# Patient Record
Sex: MALE | Race: BLACK OR AFRICAN AMERICAN
[De-identification: names, ages, dates, MRNs, and addresses within clinical notes are randomized per-mention and may not be internally consistent; named-entity substitution may affect disease eponyms.]

---

## 2017-12-13 ENCOUNTER — HOSPITAL ENCOUNTER (OUTPATIENT)
Dept: HOSPITAL 61 - KCIC MRI | Age: 62
Discharge: HOME | End: 2017-12-13
Attending: NURSE PRACTITIONER
Payer: COMMERCIAL
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
## 2017-12-13 DIAGNOSIS — M94.262: ICD-10-CM
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
## 2017-12-13 DIAGNOSIS — S83.242A: Primary | ICD-10-CM
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
## 2017-12-13 PROCEDURE — 73721 MRI JNT OF LWR EXTRE W/O DYE: CPT

## 2017-12-18 ENCOUNTER — HOSPITAL ENCOUNTER (OUTPATIENT)
Dept: HOSPITAL 61 - SURG | Age: 62
End: 2017-12-18
Attending: ORTHOPAEDIC SURGERY
Payer: COMMERCIAL

## 2017-12-18 VITALS
SYSTOLIC BLOOD PRESSURE: 119 MMHG | DIASTOLIC BLOOD PRESSURE: 80 MMHG | SYSTOLIC BLOOD PRESSURE: 119 MMHG | DIASTOLIC BLOOD PRESSURE: 80 MMHG
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
## 2017-12-18 DIAGNOSIS — S83.242A: Primary | ICD-10-CM
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
## 2017-12-18 DIAGNOSIS — F17.200: ICD-10-CM
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
## 2017-12-18 DIAGNOSIS — H40.9: ICD-10-CM

## 2017-12-18 DIAGNOSIS — Z98.890: ICD-10-CM

## 2017-12-18 PROCEDURE — 29881 ARTHRS KNE SRG MNISECTMY M/L: CPT

## 2017-12-18 PROCEDURE — C1782 MORCELLATOR: HCPCS

## 2017-12-18 PROCEDURE — S0028 INJECTION, FAMOTIDINE, 20 MG: HCPCS

## 2017-12-18 NOTE — PDOC4
Operative Note


Operative Note


Date of Procedure: 12/18/17





Surgeon: Kurtis Ron





Preoperative Diagnosis: Left knee medial meniscus tear





Postoperative diagnosis same





Procedure performed: Left knee arthroscopic partial medial meniscectomy





Tourniquet time: 23 minutes





Anesthesia: Gen.





Findings: Patient had grade 1 changes of patellofemoral joint


No loose bodies


Grade 1-2 changes medial femoral condyle from 30-60


Complex undersurface tear of posterior body of medial meniscus


Intact cruciate ligaments


Soft and fissured lateral tibial plateau cartilage


Intact cartilage lateral femoral condyle next


Intact lateral meniscus





Complications: None





Reason for procedure: Osman is a very pleasant 62-year-old with point 

tenderness at his medial joint line of his left knee that has been going on for 

quite some time. He has tried and rehabilitation program, self-directed exercise

, anti-inflammatories, and intra-articular injection which gave him only 

temporary relief. Due to persistent pain in his physical exam and imaging 

findings, I discussed the risks, benefits, and alternatives to the above 

surgery and he wished to proceed.





Description of procedure:  Patient was greeted in the preoperative area by 

myself for the correct extremity was marked and verified. He was then taken 

back to the operative suite and his antibiotics were started during transfer. 

Once in the OR, he was transferred gently supine to the OR table and secured to 

the bed with all pressure points padded. A nonsterile tourniquet was placed to 

his left upper thigh after successful induction of a general anesthetic. We had 

a padded bolster laterally at his hip. We had a padded bar across foot of the 

bed to maintain his knee in 90 of flexion passively. I then conducted my 

examination under anesthesia, his knee was stable to varus and valgus and had a 

negative Lachman. No pivot. After this we proceeded prep and drape left lower 

extremity in her usual sterile fashion and conducted our standard preoperative 

timeout. The extremity was then exsanguinated with an Esmarch and tourniquet 

was insufflated to 250 mmHg. I then palpated and marked her surface anatomy and 

made an incision for my standard anterolateral arthroscopic portal and 

introduced a blunt arthroscopic trocar into the suprapatellar pouch followed by 

the camera. I then began my diagnostic arthroscopy with the above-noted 

findings. Upon entering the medial compartment, used a spinal to localize an 

anteromedial portal and incised skin in accordance with this. I introduce my 

probe and continued on with my diagnostic arthroscopy. I inspected his meniscus 

and noted the tear pattern and probed it. I then debrided the meniscus tear 

back to stable edges with a combination of shaver and arthroscopic biters at 

his medial meniscus. I then continued on and inspected his notch as well as 

placement to figure-of-four position and inspected his lateral compartment. I 

then reinspected the gutters as well as I placed the camera in the 

suprapatellar pouch followed by the shaver performed repeated aspiration 

maneuvers through the shaver to ensure that accomplished removing all loose 

debris. I then removed all excess arthroscopic fluid and the arthroscopic 

instrumentation. The portals were closed with simple interrupted 2-0 nylon. 

Steri-Strips Xeroform sterile gauze and ABDs were applied followed by an Ace 

wrap. Surgery was tolerated well. No complications. At the conclusion of the 

surgery, he was awakened from anesthesia and transferred gently supine to the 

recovery room cart. He was taken to the PACU in a stable and x-ray condition. 

Postoperative plan is weight-bear as tolerated. He will follow up in 2 weeks, 

sooner should a problem arise











KURTIS RON II, MD Dec 18, 2017 10:28

## 2018-05-04 ENCOUNTER — HOSPITAL ENCOUNTER (OUTPATIENT)
Dept: HOSPITAL 61 - KCIC MRI | Age: 63
Discharge: HOME | End: 2018-05-04
Payer: COMMERCIAL

## 2018-05-04 DIAGNOSIS — R60.0: ICD-10-CM

## 2018-05-04 DIAGNOSIS — M25.462: ICD-10-CM

## 2018-05-04 DIAGNOSIS — M22.42: Primary | ICD-10-CM

## 2018-05-04 PROCEDURE — 73721 MRI JNT OF LWR EXTRE W/O DYE: CPT

## 2018-05-04 PROCEDURE — 73720 MRI LWR EXTREMITY W/O&W/DYE: CPT

## 2018-05-04 RX ADMIN — GADOBUTROL 1 MMOL: 604.72 INJECTION INTRAVENOUS at 10:25

## 2018-05-18 ENCOUNTER — HOSPITAL ENCOUNTER (OUTPATIENT)
Dept: HOSPITAL 61 - EKG | Age: 63
Discharge: HOME | End: 2018-05-18
Payer: COMMERCIAL
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
## 2018-05-18 DIAGNOSIS — S83.242A: Primary | ICD-10-CM
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
## 2018-05-18 PROCEDURE — 93005 ELECTROCARDIOGRAM TRACING: CPT

## 2019-02-05 ENCOUNTER — HOSPITAL ENCOUNTER (OUTPATIENT)
Dept: HOSPITAL 61 - KCIC MRI | Age: 64
Discharge: HOME | End: 2019-02-05
Attending: INTERNAL MEDICINE
Payer: COMMERCIAL

## 2019-02-05 DIAGNOSIS — K76.9: Primary | ICD-10-CM

## 2019-02-05 PROCEDURE — 74183 MRI ABD W/O CNTR FLWD CNTR: CPT

## 2019-02-05 PROCEDURE — A9585 GADOBUTROL INJECTION: HCPCS

## 2019-02-05 RX ADMIN — GADOBUTROL ONE MMOL: 604.72 INJECTION INTRAVENOUS at 09:04

## 2019-02-05 NOTE — KCIC
Indication: Liver lesion.

 

TECHNIQUE: Axial and coronal T2, axial in and out of phase, axial 

T1-weighted image sequences obtained without IV contrast. After infusion 

of 10 mL of contrast, axial T1-weighted images were obtained at multiple 

intervals.

 

COMPARISON: CT abdomen pelvis from 2009

 

FINDINGS:

Heart is normal in size. No pericardial or pleural effusion. Liver is 

normal in morphology without evidence of hepatic steatosis. Multiple liver

lesions are seen as follows:

-1.7 x 1.3 cm high intensity T2/low intensity T1 signal lesion in the 

segment 2 of the liver demonstrating no enhancement compatible with simple

cyst. Other scattered lesions demonstrating similar characteristics are 

seen.

 

-1.8 x 1.8 cm T2 hyperintense/T1 hypointense lesion demonstrating nodular 

enhancement and complete internal filling on delayed images compatible 

with hemangioma. Lesion demonstrating similar characteristics is seen in 

segment 5 measuring 1.1 x 1.1 cm.

 

Spleen is unenlarged and shows no focal lesion. Intrinsic T1 signal is 

preserved in the pancreas. No intra or extrahepatic biliary duct dilation.

No main pancreatic duct dilation. Adrenal glands demonstrate no 

nodularity. Simple cyst is seen in the right kidney. No hydronephrosis or 

suspicious renal lesion. No enlarged retroperitoneal adenopathy. 

Visualized bowel demonstrates no evidence of obstruction. No enhancing 

bone lesion.

 

IMPRESSION: Multiple liver lesions compatible with simple cysts and 

hemangiomas. No follow-up needed.

 

Electronically signed by: Samuel Gao DO (2/5/2019 9:36 AM) [id number]

## 2023-03-09 NOTE — DISCH
DISCHARGE INSTRUCTIONS


Condition on Discharge


Condition on Discharge:  Stable





Activity After Discharge


Activity Instructions for Disc:  Other ROM activity


Other activity instructions:  no athletics or impact for 6 weeks


Bathing Instructions:  Shower-keep dressing dry


Weight Bearing Status after Di:  As tolerated





Diet after Discharge


Diet after Discharge:  Regular





Wound Incision Care


Wound/Incision Care:  Ice to area for comfort, Keep wound/cast CDI, Change 

dressing





Contacting the DR. after DC


Call your doctor for:  Concerns you may have





Follow-Up


Follow up with:  Darya in 2wks





Treatment/Equipment after DC


Comment:  use crutches for a couple of days if needed











THELMA RON II, MD Dec 18, 2017 10:25 No